# Patient Record
Sex: FEMALE | Race: OTHER | Employment: UNEMPLOYED | URBAN - METROPOLITAN AREA
[De-identification: names, ages, dates, MRNs, and addresses within clinical notes are randomized per-mention and may not be internally consistent; named-entity substitution may affect disease eponyms.]

---

## 2017-07-28 ENCOUNTER — OFFICE VISIT (OUTPATIENT)
Dept: OBGYN CLINIC | Age: 27
End: 2017-07-28
Payer: MEDICARE

## 2017-07-28 VITALS
BODY MASS INDEX: 23.56 KG/M2 | WEIGHT: 138 LBS | DIASTOLIC BLOOD PRESSURE: 72 MMHG | HEART RATE: 80 BPM | SYSTOLIC BLOOD PRESSURE: 110 MMHG | HEIGHT: 64 IN

## 2017-07-28 DIAGNOSIS — Z30.09 ENCOUNTER FOR OTHER GENERAL COUNSELING OR ADVICE ON CONTRACEPTION: Primary | ICD-10-CM

## 2017-07-28 PROCEDURE — 99402 PREV MED CNSL INDIV APPRX 30: CPT | Performed by: OBSTETRICS & GYNECOLOGY

## 2017-07-28 RX ORDER — FERROUS SULFATE 325(65) MG
TABLET ORAL
COMMUNITY
Start: 2017-05-31 | End: 2020-07-13

## 2017-07-28 ASSESSMENT — ENCOUNTER SYMPTOMS
COUGH: 0
ABDOMINAL PAIN: 0
BACK PAIN: 0
HEARTBURN: 0
BLURRED VISION: 0
PHOTOPHOBIA: 0
SHORTNESS OF BREATH: 0

## 2017-08-02 ENCOUNTER — PROCEDURE VISIT (OUTPATIENT)
Dept: OBGYN CLINIC | Age: 27
End: 2017-08-02
Payer: MEDICARE

## 2017-08-02 VITALS
WEIGHT: 138 LBS | DIASTOLIC BLOOD PRESSURE: 49 MMHG | SYSTOLIC BLOOD PRESSURE: 101 MMHG | HEIGHT: 64 IN | BODY MASS INDEX: 23.56 KG/M2 | HEART RATE: 68 BPM

## 2017-08-02 DIAGNOSIS — Z30.014 ENCOUNTER FOR INITIAL PRESCRIPTION OF INTRAUTERINE CONTRACEPTIVE DEVICE (IUD): Primary | ICD-10-CM

## 2017-08-02 LAB
CONTROL: NORMAL
PREGNANCY TEST URINE, POC: NORMAL

## 2017-08-02 PROCEDURE — 81025 URINE PREGNANCY TEST: CPT | Performed by: OBSTETRICS & GYNECOLOGY

## 2017-08-02 PROCEDURE — 58300 INSERT INTRAUTERINE DEVICE: CPT | Performed by: OBSTETRICS & GYNECOLOGY

## 2017-09-05 ENCOUNTER — OFFICE VISIT (OUTPATIENT)
Dept: OBGYN CLINIC | Age: 27
End: 2017-09-05

## 2017-09-05 ENCOUNTER — HOSPITAL ENCOUNTER (OUTPATIENT)
Age: 27
Setting detail: SPECIMEN
Discharge: HOME | End: 2017-09-05
Payer: MEDICARE

## 2017-09-05 VITALS
HEIGHT: 64 IN | HEART RATE: 72 BPM | WEIGHT: 138 LBS | BODY MASS INDEX: 23.56 KG/M2 | DIASTOLIC BLOOD PRESSURE: 59 MMHG | SYSTOLIC BLOOD PRESSURE: 109 MMHG

## 2017-09-05 DIAGNOSIS — N89.8 VAGINAL ODOR: ICD-10-CM

## 2017-09-05 DIAGNOSIS — Z30.431 IUD CHECK UP: Primary | ICD-10-CM

## 2017-09-05 ASSESSMENT — ENCOUNTER SYMPTOMS
ABDOMINAL PAIN: 0
CONSTIPATION: 0

## 2017-09-06 DIAGNOSIS — N76.0 BV (BACTERIAL VAGINOSIS): Primary | ICD-10-CM

## 2017-09-06 DIAGNOSIS — B96.89 BV (BACTERIAL VAGINOSIS): Primary | ICD-10-CM

## 2017-09-06 LAB
DIRECT EXAM: ABNORMAL
Lab: ABNORMAL
SPECIMEN DESCRIPTION: ABNORMAL
STATUS: ABNORMAL

## 2017-09-06 RX ORDER — METRONIDAZOLE 500 MG/1
500 TABLET ORAL 2 TIMES DAILY
Qty: 14 TABLET | Refills: 0 | Status: SHIPPED | OUTPATIENT
Start: 2017-09-06 | End: 2017-09-13

## 2017-09-08 LAB
DIRECT EXAM: NORMAL
DIRECT EXAM: NORMAL
Lab: NORMAL
SPECIMEN DESCRIPTION: NORMAL
STATUS: NORMAL

## 2018-08-29 ENCOUNTER — OFFICE VISIT (OUTPATIENT)
Dept: OBGYN CLINIC | Age: 28
End: 2018-08-29

## 2018-08-29 VITALS
BODY MASS INDEX: 25.44 KG/M2 | WEIGHT: 149 LBS | SYSTOLIC BLOOD PRESSURE: 122 MMHG | HEIGHT: 64 IN | DIASTOLIC BLOOD PRESSURE: 59 MMHG | HEART RATE: 94 BPM

## 2018-08-29 DIAGNOSIS — Z30.431 IUD CHECK UP: Primary | ICD-10-CM

## 2018-08-29 PROCEDURE — 4004F PT TOBACCO SCREEN RCVD TLK: CPT | Performed by: OBSTETRICS & GYNECOLOGY

## 2018-08-29 PROCEDURE — G8419 CALC BMI OUT NRM PARAM NOF/U: HCPCS | Performed by: OBSTETRICS & GYNECOLOGY

## 2018-08-29 PROCEDURE — G8427 DOCREV CUR MEDS BY ELIG CLIN: HCPCS | Performed by: OBSTETRICS & GYNECOLOGY

## 2018-08-29 PROCEDURE — 99024 POSTOP FOLLOW-UP VISIT: CPT | Performed by: OBSTETRICS & GYNECOLOGY

## 2018-08-29 ASSESSMENT — ENCOUNTER SYMPTOMS
BACK PAIN: 0
ABDOMINAL PAIN: 0
COUGH: 0
SHORTNESS OF BREATH: 0

## 2018-09-20 ENCOUNTER — TELEPHONE (OUTPATIENT)
Dept: OBGYN CLINIC | Age: 28
End: 2018-09-20

## 2018-10-05 ENCOUNTER — PROCEDURE VISIT (OUTPATIENT)
Dept: OBGYN CLINIC | Age: 28
End: 2018-10-05
Payer: MEDICARE

## 2018-10-05 ENCOUNTER — HOSPITAL ENCOUNTER (OUTPATIENT)
Age: 28
Setting detail: SPECIMEN
Discharge: HOME | End: 2018-10-05
Payer: MEDICARE

## 2018-10-05 VITALS
BODY MASS INDEX: 25.44 KG/M2 | SYSTOLIC BLOOD PRESSURE: 111 MMHG | DIASTOLIC BLOOD PRESSURE: 67 MMHG | HEART RATE: 71 BPM | WEIGHT: 149 LBS | HEIGHT: 64 IN

## 2018-10-05 DIAGNOSIS — Z30.017 NEXPLANON INSERTION: ICD-10-CM

## 2018-10-05 DIAGNOSIS — N89.8 VAGINAL DISCHARGE: ICD-10-CM

## 2018-10-05 DIAGNOSIS — Z30.432 ENCOUNTER FOR IUD REMOVAL: Primary | ICD-10-CM

## 2018-10-05 PROCEDURE — 11981 INSERTION DRUG DLVR IMPLANT: CPT | Performed by: OBSTETRICS & GYNECOLOGY

## 2018-10-05 PROCEDURE — 58301 REMOVE INTRAUTERINE DEVICE: CPT | Performed by: OBSTETRICS & GYNECOLOGY

## 2018-10-05 NOTE — PROGRESS NOTES
the IUD was removed without any difficulty. Nexplanon insertion:  The patient was positioned comfortably on our procedure table. She was consented earlier in the appointment and the procedure risk and complications were reviewed. A sterile prep and drape was completed and a 1% xylocaine for local anesthetic was utilized, 2 ml. The device was deployed just under the skin. A sterile dressing and steri-strip was applied with a pressure wrap. The patient tolerated the procedure well. Formal restrictions were discussed in detail. She is to notify our office if any swelling, redness, temperature, or limb restriction or numbness. No baths,      The patient tolerated the procedure without difficulty. She is to notify the office or go to the nearest Emergency Department if she experiences Abdominal Pain, Temperatures more than 101 F, Odiferous Vaginal Discharge, Dizziness or Shortness of breath. ASSESSMENT:  IUD Removal   Diagnosis Orders   1. Encounter for IUD removal  [phone] - CT REMOVE INTRAUTERINE DEVICE   2. Nexplanon insertion  ETONOGESTREL IMPLANT SYSTEM    CT INSERTION DRUG IMPLANT DEVICE   3. Vaginal discharge  Vaginitis DNA Probe     There are no active problems to display for this patient. Family Planning    reports that she has been smoking Cigarettes.   She has never used smokeless tobacco.      PLAN:  Family Planning Counseling Completed  Barrier Recommendations  Removal of Nexplanon in 3 years   Annual health follow-up  12/2018

## 2018-10-08 DIAGNOSIS — B96.89 BV (BACTERIAL VAGINOSIS): ICD-10-CM

## 2018-10-08 DIAGNOSIS — B37.31 YEAST INFECTION OF THE VAGINA: Primary | ICD-10-CM

## 2018-10-08 DIAGNOSIS — N76.0 BV (BACTERIAL VAGINOSIS): ICD-10-CM

## 2018-10-08 LAB
DIRECT EXAM: ABNORMAL
Lab: ABNORMAL
SPECIMEN DESCRIPTION: ABNORMAL
STATUS: ABNORMAL

## 2018-10-08 RX ORDER — METRONIDAZOLE 500 MG/1
500 TABLET ORAL 2 TIMES DAILY
Qty: 14 TABLET | Refills: 0 | Status: SHIPPED | OUTPATIENT
Start: 2018-10-08 | End: 2018-10-15

## 2018-10-08 RX ORDER — FLUCONAZOLE 150 MG/1
150 TABLET ORAL ONCE
Qty: 1 TABLET | Refills: 0 | Status: SHIPPED | OUTPATIENT
Start: 2018-10-08 | End: 2018-10-08

## 2018-12-28 ENCOUNTER — HOSPITAL ENCOUNTER (OUTPATIENT)
Age: 28
Setting detail: SPECIMEN
Discharge: HOME | End: 2018-12-28
Payer: MEDICARE

## 2018-12-28 ENCOUNTER — OFFICE VISIT (OUTPATIENT)
Dept: OBGYN CLINIC | Age: 28
End: 2018-12-28
Payer: MEDICARE

## 2018-12-28 VITALS
DIASTOLIC BLOOD PRESSURE: 72 MMHG | HEIGHT: 63 IN | SYSTOLIC BLOOD PRESSURE: 112 MMHG | WEIGHT: 148 LBS | HEART RATE: 80 BPM | BODY MASS INDEX: 26.22 KG/M2

## 2018-12-28 DIAGNOSIS — Z01.419 WOMEN'S ANNUAL ROUTINE GYNECOLOGICAL EXAMINATION: Primary | ICD-10-CM

## 2018-12-28 PROCEDURE — 99395 PREV VISIT EST AGE 18-39: CPT | Performed by: OBSTETRICS & GYNECOLOGY

## 2018-12-28 PROCEDURE — G8484 FLU IMMUNIZE NO ADMIN: HCPCS | Performed by: OBSTETRICS & GYNECOLOGY

## 2018-12-28 ASSESSMENT — ENCOUNTER SYMPTOMS
COUGH: 0
BACK PAIN: 0
ABDOMINAL PAIN: 0
SHORTNESS OF BREATH: 0

## 2018-12-31 LAB
HPV SAMPLE: NORMAL
HPV SOURCE: NORMAL
HPV, GENOTYPE 16: NOT DETECTED
HPV, GENOTYPE 18: NOT DETECTED
HPV, HIGH RISK OTHER: NOT DETECTED
HPV, INTERPRETATION: NORMAL

## 2019-01-11 LAB — CYTOLOGY REPORT: NORMAL

## 2019-04-23 PROBLEM — F32.9 MAJOR DEPRESSIVE DISORDER: Status: ACTIVE | Noted: 2019-04-23

## 2019-04-23 PROBLEM — Z76.89 ENCOUNTER TO ESTABLISH CARE WITH NEW DOCTOR: Status: ACTIVE | Noted: 2019-04-23

## 2019-04-29 PROBLEM — Z13.31 POSITIVE DEPRESSION SCREENING: Status: ACTIVE | Noted: 2019-04-29

## 2019-05-23 PROBLEM — R53.83 FATIGUE DUE TO DEPRESSION: Status: ACTIVE | Noted: 2019-05-23

## 2019-05-23 PROBLEM — F32.A FATIGUE DUE TO DEPRESSION: Status: ACTIVE | Noted: 2019-05-23

## 2020-07-13 PROBLEM — F41.9 ANXIETY: Status: ACTIVE | Noted: 2020-07-13

## 2020-07-27 PROBLEM — L30.9 DERMATITIS: Status: ACTIVE | Noted: 2020-07-27

## 2020-07-27 PROBLEM — E66.9 CLASS 1 OBESITY WITHOUT SERIOUS COMORBIDITY WITH BODY MASS INDEX (BMI) OF 33.0 TO 33.9 IN ADULT: Status: ACTIVE | Noted: 2020-07-27

## 2020-07-27 PROBLEM — R05.9 COUGH: Status: ACTIVE | Noted: 2020-07-27

## 2020-08-04 ENCOUNTER — OFFICE VISIT (OUTPATIENT)
Dept: OBGYN CLINIC | Age: 30
End: 2020-08-04
Payer: MEDICARE

## 2020-08-04 ENCOUNTER — HOSPITAL ENCOUNTER (OUTPATIENT)
Age: 30
Setting detail: SPECIMEN
Discharge: HOME | End: 2020-08-04
Payer: MEDICARE

## 2020-08-04 VITALS
HEIGHT: 64 IN | BODY MASS INDEX: 29.02 KG/M2 | DIASTOLIC BLOOD PRESSURE: 69 MMHG | SYSTOLIC BLOOD PRESSURE: 122 MMHG | WEIGHT: 170 LBS | HEART RATE: 77 BPM

## 2020-08-04 LAB
DIRECT EXAM: ABNORMAL
Lab: ABNORMAL
SPECIMEN DESCRIPTION: ABNORMAL

## 2020-08-04 PROCEDURE — 99213 OFFICE O/P EST LOW 20 MIN: CPT | Performed by: OBSTETRICS & GYNECOLOGY

## 2020-08-04 RX ORDER — FLUCONAZOLE 150 MG/1
150 TABLET ORAL ONCE
Qty: 1 TABLET | Refills: 0 | Status: SHIPPED | OUTPATIENT
Start: 2020-08-04 | End: 2020-08-04

## 2020-08-04 ASSESSMENT — ENCOUNTER SYMPTOMS
ABDOMINAL PAIN: 0
BACK PAIN: 0
SHORTNESS OF BREATH: 0
COUGH: 0

## 2020-08-04 NOTE — PROGRESS NOTES
[facility]YSICIANS  [facility] Lenea Brandon  [address] [facility] [address]  Dept: [phone]  8/4/2020    Chief Complaint   Patient presents with    Vaginal Itching    Vaginal Odor    Arm Pain     has Nexplanon    Other     Female Int. [id number]       Norma Yuen is a 27 yo female who presents for vaginal discharge/odor. She says that the discharge/odor started 4 days ago. She describes it as white discharge with a strong odor. She denies history of recurrent yeast or BV infections in the past.  She denies history of STIs in the past.  She denies any fevers/chills, and abdominal pain. She is sexually active. She has a Nexplanon in place and was having some pain in that area. She says that it started 2 weeks ago. She denies any trauma or incident involving the Nexplanon. She says that her son was holding her arm and she noticed the pain. Review of Systems   Constitutional: Negative for chills and fever. HENT: Negative for congestion. Respiratory: Negative for cough and shortness of breath. Cardiovascular: Negative for chest pain and palpitations. Gastrointestinal: Negative for abdominal pain. Genitourinary: Positive for vaginal discharge. Negative for menstrual problem. Musculoskeletal: Negative for back pain. Neurological: Negative for dizziness and light-headedness. Psychiatric/Behavioral: The patient is not nervous/anxious. O:Blood pressure 122/69, pulse 77, height 5' 4\" (1.626 m), weight 170 lb (77.1 kg), last menstrual period 07/23/2020, not currently breastfeeding. Gen:  Alert, no apparent distress  Abd:  Soft, nontender  Pelvic:  Normal appearing vulva and vagina. White clumpy discharge noted in vagina. No CMT    A/P:   Diagnosis Orders   1.  Vaginal discharge  Vaginitis DNA Probe     Rx for diflucan sent in  Discussed use of barrier protection for prevention of STI transmission  Discussed changing soaps and laundry detergent to non-scented  Discussed not douching  Return if symptoms worsen or fail to improve.     Winston You MD  [address] [place] [address]

## 2020-08-05 RX ORDER — METRONIDAZOLE 500 MG/1
500 TABLET ORAL 2 TIMES DAILY
Qty: 14 TABLET | Refills: 0 | Status: SHIPPED | OUTPATIENT
Start: 2020-08-05 | End: 2020-08-12

## 2020-08-09 ENCOUNTER — HOSPITAL ENCOUNTER (OUTPATIENT)
Dept: GENERAL RADIOLOGY | Age: 30
Discharge: HOME | End: 2020-08-11
Payer: MEDICARE

## 2020-08-09 ENCOUNTER — HOSPITAL ENCOUNTER (OUTPATIENT)
Age: 30
Discharge: HOME | End: 2020-08-11
Payer: MEDICARE

## 2020-08-09 PROCEDURE — 71046 X-RAY EXAM CHEST 2 VIEWS: CPT

## 2020-08-14 PROBLEM — R41.3 MEMORY LOSS: Status: ACTIVE | Noted: 2020-08-14

## 2020-08-26 PROBLEM — R05.9 COUGH: Status: RESOLVED | Noted: 2020-07-27 | Resolved: 2020-08-26

## 2020-11-18 RX ORDER — ESTRADIOL 1 MG/1
1 TABLET ORAL DAILY
Qty: 14 TABLET | Refills: 0 | Status: SHIPPED | OUTPATIENT
Start: 2020-11-18

## 2021-01-19 NOTE — PROGRESS NOTES
[facility] PHYSICIANS  [facility] Brain Garcia  [address] [facility]  Dept: [phone]    33 y.o.  2021      Kj Garcia is a 33 y.o. female is requesting to have her Nexplanon removed because of having heavy longer periods for the last 6 months. She does not have any other problems today. It was placed on 10/2018. She plans on using condoms for STD protection and family planning and talking to her PCP about OCPs. Past Medical History:   Diagnosis Date    Anemia     Memory loss          Past Surgical History:   Procedure Laterality Date     SECTION      X4    INTRAUTERINE DEVICE INSERTION  2017       No family history on file. Social History     Tobacco Use    Smoking status: Current Every Day Smoker     Packs/day: 1.00     Years: 6.00     Pack years: 6.00     Types: Cigarettes    Smokeless tobacco: Never Used   Substance Use Topics    Alcohol use: Not Currently     Frequency: Never    Drug use: Never       Current Outpatient Medications on File Prior to Visit   Medication Sig Dispense Refill    estradiol (ESTRACE) 1 MG tablet Take 1 tablet by mouth daily (Patient not taking: Reported on 2021) 14 tablet 0     No current facility-administered medications on file prior to visit. Allergies as of 2021 - Review Complete 2021   Allergen Reaction Noted    Ativan [lorazepam] Rash 2020    Effexor [venlafaxine]  2019         OB History    Para Term  AB Living   4 4 4 0 0     SAB TAB Ectopic Molar Multiple Live Births   0 0 0 0          # Outcome Date GA Lbr Adelso/2nd Weight Sex Delivery Anes PTL Lv   4 Term            3 Term            2 Term            1 Term                  Blood pressure 121/73, pulse 73, temperature 98.2 °F (36.8 °C), last menstrual period 2021, not currently breastfeeding.       PROCEDURE: The patient was positioned comfortably on our procedure table. She was consented earlier in the appointment and the procedure risk and complications were reviewed. A sterile prep and drape was completed and a 1% xylocaine for local anesthetic was utilized, 2 ml. The skin had a small incision just beyond the proximal tip of the insert and utilizing blunt dissection the stylet was grasped and removed. A sterile dressing and steri-strip was applied with a pressure wrap. The patient tolerated the procedure well. Formal restrictions were discussed in detail. She is to notify our office if any swelling, redness, temperature, or limb restriction or numbness. No baths, Pools or Lakes until Follow up. Showers are allowed in 36 hours. She may take Tylenol for any pain. Assessment:   Diagnosis Orders   1. Nexplanon removal  [place] REMOVAL DRUG IMPLANT DEVICE   2. Weight gain     3. Menorrhagia with regular cycle       Patient Active Problem List    Diagnosis Date Noted    Memory loss 08/14/2020    Dermatitis 07/27/2020    Class 1 obesity without serious comorbidity with body mass index (BMI) of 33.0 to 33.9 in adult 07/27/2020    Anxiety 07/13/2020    Fatigue due to depression 05/23/2019    Positive depression screening 04/29/2019    Major depressive disorder 04/23/2019    Encounter to establish care with new doctor 04/23/2019         Plan:  Return if symptoms worsen or fail to improve. Abstain  Barrier recommendations given.   Pt to follow up with PCP for OCPs per her request    Anai Singh MD

## 2021-01-20 ENCOUNTER — PROCEDURE VISIT (OUTPATIENT)
Dept: OBGYN CLINIC | Age: 31
End: 2021-01-20
Payer: MEDICARE

## 2021-01-20 VITALS — SYSTOLIC BLOOD PRESSURE: 121 MMHG | HEART RATE: 73 BPM | DIASTOLIC BLOOD PRESSURE: 73 MMHG | TEMPERATURE: 98.2 F

## 2021-01-20 DIAGNOSIS — Z30.46 NEXPLANON REMOVAL: Primary | ICD-10-CM

## 2021-01-20 DIAGNOSIS — N92.0 MENORRHAGIA WITH REGULAR CYCLE: ICD-10-CM

## 2021-01-20 DIAGNOSIS — R63.5 WEIGHT GAIN: ICD-10-CM

## 2021-01-20 PROCEDURE — 11982 REMOVE DRUG IMPLANT DEVICE: CPT | Performed by: OBSTETRICS & GYNECOLOGY

## 2021-04-27 ENCOUNTER — OFFICE VISIT (OUTPATIENT)
Dept: OBGYN CLINIC | Age: 31
End: 2021-04-27
Payer: MEDICARE

## 2021-04-27 VITALS
TEMPERATURE: 96.7 F | HEART RATE: 78 BPM | SYSTOLIC BLOOD PRESSURE: 115 MMHG | WEIGHT: 168 LBS | DIASTOLIC BLOOD PRESSURE: 74 MMHG | BODY MASS INDEX: 28.84 KG/M2

## 2021-04-27 DIAGNOSIS — N92.1 MENORRHAGIA WITH IRREGULAR CYCLE: Primary | ICD-10-CM

## 2021-04-27 DIAGNOSIS — Z30.016 ENCOUNTER FOR INITIAL PRESCRIPTION OF TRANSDERMAL PATCH HORMONAL CONTRACEPTIVE DEVICE: ICD-10-CM

## 2021-04-27 PROCEDURE — G8417 CALC BMI ABV UP PARAM F/U: HCPCS | Performed by: OBSTETRICS & GYNECOLOGY

## 2021-04-27 PROCEDURE — 99213 OFFICE O/P EST LOW 20 MIN: CPT | Performed by: OBSTETRICS & GYNECOLOGY

## 2021-04-27 PROCEDURE — G8427 DOCREV CUR MEDS BY ELIG CLIN: HCPCS | Performed by: OBSTETRICS & GYNECOLOGY

## 2021-04-27 PROCEDURE — 4004F PT TOBACCO SCREEN RCVD TLK: CPT | Performed by: OBSTETRICS & GYNECOLOGY

## 2021-04-27 RX ORDER — TRANEXAMIC ACID 650 1/1
1300 TABLET ORAL 3 TIMES DAILY
Qty: 30 TABLET | Refills: 0 | Status: SHIPPED | OUTPATIENT
Start: 2021-04-27 | End: 2021-05-02

## 2021-04-27 ASSESSMENT — ENCOUNTER SYMPTOMS
BACK PAIN: 0
SHORTNESS OF BREATH: 0
COUGH: 0
ABDOMINAL PAIN: 0

## 2021-04-27 NOTE — PROGRESS NOTES
[facility] PHYSICIANS  [facility] - [address] [facility] Lesly Almeand [address] [facility]  Dept: [phone]  Dept Fax: [phone]    21    Chief Complaint   Patient presents with    Menstrual Problem     having periods twice a month and very heavy since nexaplanon removal        Harman Oleary 33 y.o. has a complaint of irregular bleeding since the Nexplanon she has had irregular bleeding. She is having it sometimes twice a month and very heavy. She says that prior to the Nexplanon placement she had regular periods. She says that her bleeding has been keeping her up because it is so heavy. She would like to talk about how to regulate her periods. She is currently on Ramadan and is fasting during the day. Interpretor service used for this visit    Review of Systems   Constitutional: Negative for chills and fever. HENT: Negative for congestion. Respiratory: Negative for cough and shortness of breath. Cardiovascular: Negative for chest pain and palpitations. Gastrointestinal: Negative for abdominal pain. Genitourinary: Positive for menstrual problem. Negative for dyspareunia and vaginal discharge. Musculoskeletal: Negative for back pain. Neurological: Negative for dizziness and light-headedness. Psychiatric/Behavioral: The patient is not nervous/anxious. Gynecologic History  Patient's last menstrual period was 2021 (exact date).   Contraception: none  Last Pap: 18  Results: normal  Last Mammogram: n/a    Obstetric History  : 4  Para: 4  AB: 0    Past Medical History:   Diagnosis Date    Anemia     Memory loss      Past Surgical History:   Procedure Laterality Date     SECTION      X4    INTRAUTERINE DEVICE INSERTION  2017     Allergies   Allergen Reactions    Ativan [Lorazepam] Rash    Effexor [Venlafaxine]      Current Outpatient Medications   Medication Sig Dispense Refill    norelgestromin-ethinyl estradiol (ORTHO

## 2021-06-15 ENCOUNTER — OFFICE VISIT (OUTPATIENT)
Dept: OBGYN CLINIC | Age: 31
End: 2021-06-15
Payer: MEDICARE

## 2021-06-15 VITALS
WEIGHT: 164 LBS | DIASTOLIC BLOOD PRESSURE: 71 MMHG | HEIGHT: 65 IN | HEART RATE: 82 BPM | SYSTOLIC BLOOD PRESSURE: 111 MMHG | BODY MASS INDEX: 27.32 KG/M2

## 2021-06-15 DIAGNOSIS — Z01.419 ENCOUNTER FOR WELL WOMAN EXAM WITH ROUTINE GYNECOLOGICAL EXAM: Primary | ICD-10-CM

## 2021-06-15 DIAGNOSIS — Z30.45 ENCOUNTER FOR SURVEILLANCE OF TRANSDERMAL PATCH HORMONAL CONTRACEPTIVE DEVICE: ICD-10-CM

## 2021-06-15 DIAGNOSIS — N93.9 ABNORMAL UTERINE BLEEDING (AUB): ICD-10-CM

## 2021-06-15 PROCEDURE — 99395 PREV VISIT EST AGE 18-39: CPT | Performed by: OBSTETRICS & GYNECOLOGY

## 2021-06-15 ASSESSMENT — ENCOUNTER SYMPTOMS
SHORTNESS OF BREATH: 0
BACK PAIN: 0
COUGH: 0
ABDOMINAL PAIN: 0

## 2021-06-15 NOTE — PROGRESS NOTES
[facility] PHYSICIANS  [facility] - [address]  Dept: [phone]    Chief complaint:   Chief Complaint   Patient presents with    Vaginal Bleeding     pt states she gets two periods a month with her [facility] for 5 months        History Present Illness: Harman is a 32 yo female who presents for her annual exam.  She was having periods twice a month. She was seen in the office 6 weeks ago and started on the patch. She says that she does have some mood swings with it. She says her periods are lighter than they were and they last 7 days. She is still having them twice a month. She is sexually active with her . She denies any dyspareunia. She denies any pelvic pain. She denies any bowel or bladder issues.  used for appt    Current Medications (OTC/Herbal):   Current Outpatient Medications   Medication Sig Dispense Refill    norelgestromin-ethinyl estradiol (ORTHO EVRA) 150-35 MCG/24HR Place 1 patch onto the skin once a week 3 patch 12    tranexamic acid (LYSTEDA) 650 MG TABS tablet Take 2 tablets by mouth 3 times daily for 5 days 30 tablet 0    estradiol (ESTRACE) 1 MG tablet Take 1 tablet by mouth daily (Patient not taking: Reported on 2021) 14 tablet 0     No current facility-administered medications for this visit. Allergies:    Allergies   Allergen Reactions    Ativan [Lorazepam] Rash    Effexor [Venlafaxine]      Past Medical History:   Past Medical History:   Diagnosis Date    Anemia     Memory loss      Past Surgical History:   Past Surgical History:   Procedure Laterality Date     SECTION      X4    INTRAUTERINE DEVICE INSERTION  2017     Obstetric History:   4  Para 4  Gynecologic History: LMP 19   Menarche 11  Duration 7 d    Interval q  14 d  Tampons/Pads in a day: 5-8  Last Pap: 18, normal pap neg HPV       Any history of abnormal paps no    PriorColpo/Biopsy n/a     Last Mammogram n/a  Contraception: patch  Complications: none  STDs: none  Psychosocial History: Occupation:   Stay at home mom   Caffeine No    At risk for depression No    Abuse:   No  Seatbelt:   Yes  Exercise:  No    Social History     Socioeconomic History    Marital status:      Spouse name: Not on file    Number of children: Not on file    Years of education: Not on file    Highest education level: Not on file   Occupational History    Not on file   Tobacco Use    Smoking status: Current Every Day Smoker     Packs/day: 1.00     Years: 6.00     Pack years: 6.00     Types: Cigarettes    Smokeless tobacco: Never Used   Vaping Use    Vaping Use: Never used   Substance and Sexual Activity    Alcohol use: Not Currently    Drug use: Never    Sexual activity: Not Currently   Other Topics Concern    Not on file   Social History Narrative    ** Merged History Encounter **          Social Determinants of Health     Financial Resource Strain:     Difficulty of Paying Living Expenses:    Food Insecurity:     Worried About Running Out of Food in the Last Year:     Ran Out of Food in the Last Year:    Transportation Needs:     Lack of Transportation (Medical):  Lack of Transportation (Non-Medical):    Physical Activity:     Days of Exercise per Week:     Minutes of Exercise per Session:    Stress:     Feeling of Stress :    Social Connections:     Frequency of Communication with Friends and Family:     Frequency of Social Gatherings with Friends and Family:     Attends Sabianist Services:     Active Member of Clubs or Organizations:     Attends Club or Organization Meetings:     Marital Status:    Intimate Partner Violence:     Fear of Current or Ex-Partner:     Emotionally Abused:     Physically Abused:     Sexually Abused:        History reviewed. No pertinent family history. Review of Systems:   Review of Systems   Constitutional: Negative for chills and fever.    HENT: Negative for congestion. Respiratory: Negative for cough and shortness of breath. Cardiovascular: Negative for chest pain and palpitations. Gastrointestinal: Negative for abdominal pain. Genitourinary: Positive for menstrual problem. Negative for dyspareunia and vaginal discharge. Musculoskeletal: Negative for back pain. Neurological: Negative for dizziness and light-headedness. Psychiatric/Behavioral: The patient is not nervous/anxious. Physical exam:  vitals:  Height   5  ft    4 in,  Weight    164 lbs,   111/71 BP  Gen: alert, no apparent distress  HEENT:No pathologic skin lesions noted,NC/AT,PERRL, normal midline nontender thyroid   Lung Exam: Clear to auscultation in all fields bilaterally, without wheezes,rales or rhonchi. Cardiac Exam: Normal sinus rhythm andrate, without murmurs, rubs or gallops appreciated. Breast Exam: Symmetric without pathological skin changes, nontender without discrete suspicious masses palpated, supraclavicular or axillary adenopathy or nipple discharge noted. Abdominal Exam: Nontender to deep palpation without organomegaly, masses or CVAT appreciated, BS positive. No spinal deformation or tenderness. External Genitalia: Normal development without vulvar,vaginal or cervical lesions noted. Normal vaginal discharge, uterus anterior, 4-6 weeks without CMT. Adnexa nontender without abnormal masses bilaterally. Rectal Exam: Omitted. Extremities: Nontender without clubbing, cyanosis or edema. F.R.O.M. Neurologic Exam: Grossly intact without noted sensorimotor deficits and oriented x 3. Assessment/Plan:   Unremarkable annual Gyn exam.    Cervical Cytology Evaluation begins at 24years old. If Negative Cytology, Follow-up screening per current guidelines. Mammograms every 1year. If 37 yo and last mammogram was negative. Colonoscopy screening reviewed as well as onset for bone density testing. Birth control and barrier recommendations discussed.   STD counseling and prevention reviewed. Routine health maintenance per patients PCP.   Pt to follow up for annual exam in 1 year    Herb Shaw MD  [address]

## 2021-09-13 ENCOUNTER — OFFICE VISIT (OUTPATIENT)
Dept: OBGYN CLINIC | Age: 31
End: 2021-09-13
Payer: MEDICARE

## 2021-09-13 VITALS
SYSTOLIC BLOOD PRESSURE: 117 MMHG | BODY MASS INDEX: 26.33 KG/M2 | WEIGHT: 158 LBS | DIASTOLIC BLOOD PRESSURE: 72 MMHG | HEART RATE: 74 BPM | HEIGHT: 65 IN

## 2021-09-13 DIAGNOSIS — N92.6 MENSTRUAL PROBLEM: Primary | ICD-10-CM

## 2021-09-13 PROCEDURE — 4004F PT TOBACCO SCREEN RCVD TLK: CPT | Performed by: PHYSICIAN ASSISTANT

## 2021-09-13 PROCEDURE — G8417 CALC BMI ABV UP PARAM F/U: HCPCS | Performed by: PHYSICIAN ASSISTANT

## 2021-09-13 PROCEDURE — 99213 OFFICE O/P EST LOW 20 MIN: CPT | Performed by: PHYSICIAN ASSISTANT

## 2021-09-13 PROCEDURE — G8427 DOCREV CUR MEDS BY ELIG CLIN: HCPCS | Performed by: PHYSICIAN ASSISTANT

## 2021-09-13 NOTE — PROGRESS NOTES
[facility], [address]    Problem Visit      Harman Oleary  2023             was used for the entire visit           Primary Care Physician: Madhuri Moore MD    CC:   Chief Complaint   Patient presents with    Irregular Menses     [facility]#[id number] x2 a month for 7 months          HPI: Campos Mir is a 33 y.o. female  Patient's last menstrual period was 2021. The patient was seen and examined. She is here for discussion of her menses. She states she is having two menstrual cycles per month. She previously had Nexplanon implant which was removed in 2021. She was then placed on combination ortho vera patch of which she follows up today to discuss. She states she has used the combo patch for 2 month and then stopped due to abnormal menses. She is currently taking oral iron supplement due to her provider telling her she was \"anemic\"    She complains of irregular/heavy bleeding and dysmenorrhea. Her periods are irregular and last 7 days. She describes them as moderate.      REVIEW OF SYSTEMS:  Constitutional: negative fever, negative chills  HEENT: negative visual disturbances, negative headaches  Respiratory: negative dyspnea, negative cough  Cardiovascular: negative chest pain,  negative palpitations  Gastrointestinal: negative abdominal pain, negative RUQ pain, negative N/V, negative diarrhea, negative constipation  Genitourinary: negative dysuria, negative vaginal discharge  Dermatological: negative rash  Hematologic: negative bruising  Immunologic/Lymphatic: negative recent illness, negative recent sick contact  Musculoskeletal: negative back pain, negative myalgias, negative arthralgias  Neurological:  negative dizziness, negative weakness  Behavior/Psych: negative depression, negative anxiety    OBSTETRICAL HISTORY:  OB History    Para Term  AB Living   4 4 4 0 0     SAB TAB Ectopic Molar Multiple Live Births   0 0 0 0          # Outcome Date GA Lbr Adelso/2nd Weight Sex Delivery Anes PTL Lv   4 Term            3 Term            2 Term            1 Term                PAST MEDICAL HISTORY:      Diagnosis Date    Anemia     Memory loss        PAST SURGICAL HISTORY:                                                                    Procedure Laterality Date     SECTION      X4    INTRAUTERINE DEVICE INSERTION  2017       MEDICATIONS:  Current Outpatient Medications   Medication Sig Dispense Refill    Ferrous Sulfate (IRON PO) Take by mouth      norelgestromin-ethinyl estradiol (ORTHO EVRA) 150-35 MCG/24HR Place 1 patch onto the skin once a week (Patient not taking: Reported on 2021) 3 patch 12    tranexamic acid (LYSTEDA) 650 MG TABS tablet Take 2 tablets by mouth 3 times daily for 5 days 30 tablet 0    estradiol (ESTRACE) 1 MG tablet Take 1 tablet by mouth daily (Patient not taking: Reported on 2021) 14 tablet 0     No current facility-administered medications for this visit. ALLERGIES:   Allergies as of 2021 - Fully Reviewed 2021   Allergen Reaction Noted    Ativan [lorazepam] Rash 2020    Effexor [venlafaxine]  2019                                   VITALS:  Vitals:    21 1557   BP: 117/72   Site: Left Upper Arm   Position: Sitting   Cuff Size: Large Adult   Pulse: 74   Weight: 158 lb (71.7 kg)   Height: 5' 5\" (1.651 m)                                                                                                                                                                      PHYSICAL EXAM:   General Appearance: Appears healthy. Alert; in no acute distress. Pleasant. Skin: Skin color, texture, turgor normal. No rashes or lesions. Psych:  oriented to time, place and person       DATA:  No results found for this visit on 21.     ASSESSMENT & PLAN:    Andre Evans is a 33 y.o. female  Patient's last menstrual period was 2021.  -We discussed various forms of treatment for abnormal uterine bleeding and patient prefers to continue treatment with combination patch. We discussed appropriate use of medication as prescribed for 6 months in order to adequately assess her concerns     -RTC annual appointment     Patient Active Problem List    Diagnosis Date Noted    Memory loss 08/14/2020    Dermatitis 07/27/2020    Class 1 obesity without serious comorbidity with body mass index (BMI) of 33.0 to 33.9 in adult 07/27/2020    Anxiety 07/13/2020    Fatigue due to depression 05/23/2019    Positive depression screening 04/29/2019    Major depressive disorder 04/23/2019    Encounter to establish care with new doctor 04/23/2019       Return in about 2 weeks (around 9/27/2021) for annual exam.    Counseling Completed:    Discussed birth control and barrier recommendations. Discussed STD counseling and prevention. Discussed Gardisil counseling for all patients 10-35 yo. Hereditary Breast, Ovarian, Colon and Uterine Cancer screening discussed. Tobacco & Secondary smoke risks discussed; with recommendation for cessation and avoidance. Routine health maintenance per patients PCP discussed. Patient was seen with total face to face time of 20 minutes. More than 50% of this visit was on counseling and education regarding her diagnose(s) as listed below and options. She was also counseled on her preventative health maintenance recommendations and follow-up. Diagnosis Orders   1.  Menstrual problem           Maty Gonzalez PA-C  [facility]  9/13/2021, 4:47 PM